# Patient Record
(demographics unavailable — no encounter records)

---

## 2024-10-28 NOTE — HISTORY OF PRESENT ILLNESS
[de-identified] : 40 yo M with hx of HTN presenting for evaluation of back pain x1 year no acute injuries or trauma, denies previous hx of surgeries feels pain at night when going to sleep and when he wakes up in the morning, pain gets better throughout the day  every once in a while will get a sharp pain around his shoulders/neck area without radiation to the hands denies radicular symptoms in the upper or lower extremities denies any weight loss, fevers, night sweats, difficulty breathing, hemoptysis, bowel or bladder incontinence he has been using tylenol or pain management, reports provides some relief

## 2024-10-28 NOTE — PHYSICAL EXAM
Subjective   Gemini Deng is a 46 y.o. female.     She presents today to establish care and for her routine follow-up on current medical problems.  She was previously a patient of Ese Mahmood.  Medical history significant for hypertension, anxiety, vitamin D deficiency, and hypothyroidism.  Surgical history significant for gastric sleeve.  Her BMI is currently 53.8%.  She does have concerns with anxiety and panic symptoms.  She is interested in something she can take every day to help manage the symptoms.  She has previously taken BuSpar, however, this has not been as effective in managing her symptoms.  She has been struggling with chronic fatigue symptoms.  She would like to have her B12 level checked.  Her routine fasting labs are up-to-date.  She is otherwise without any other new complaints today in the office.    Anxiety  Presents for follow-up visit. Symptoms include decreased concentration, excessive worry, nervous/anxious behavior, panic and restlessness. Patient reports no chest pain, compulsions, depressed mood, dizziness, dry mouth, feeling of choking, hyperventilation, impotence, insomnia, irritability, malaise, muscle tension, nausea, obsessions, palpitations, shortness of breath or suicidal ideas. Symptoms occur most days. The severity of symptoms is interfering with daily activities. The quality of sleep is fair.     Compliance with medications is %.   Hypertension  This is a chronic problem. The current episode started more than 1 year ago. The problem has been resolved since onset. The problem is controlled. Associated symptoms include anxiety. Pertinent negatives include no blurred vision, chest pain, headaches, malaise/fatigue, neck pain, orthopnea, palpitations, peripheral edema, PND, shortness of breath or sweats. Risk factors for coronary artery disease include family history, obesity, sedentary lifestyle and stress. Past treatments include angiotensin blockers and diuretics.  Current antihypertension treatment includes angiotensin blockers and diuretics. The current treatment provides significant improvement. There are no compliance problems.  Identifiable causes of hypertension include a thyroid problem.   Thyroid Problem  Presents for follow-up visit. Symptoms include anxiety. Patient reports no cold intolerance, constipation, depressed mood, diaphoresis, diarrhea, fatigue, hair loss, heat intolerance, hoarse voice, leg swelling, menstrual problem, nail problem, palpitations, tremors, visual change, weight gain or weight loss. The symptoms have been stable.        The following portions of the patient's history were reviewed and updated as appropriate: allergies, current medications, past family history, past medical history, past social history, past surgical history and problem list.    Review of Systems   Constitutional: Negative.  Negative for diaphoresis, fatigue, irritability, malaise/fatigue, unexpected weight gain and unexpected weight loss.   HENT: Negative.  Negative for hoarse voice.    Eyes: Negative.  Negative for blurred vision.   Respiratory: Negative.  Negative for shortness of breath.    Cardiovascular: Negative.  Negative for chest pain, palpitations, orthopnea and PND.   Gastrointestinal: Negative.  Negative for constipation, diarrhea and nausea.   Endocrine: Negative for cold intolerance and heat intolerance.   Genitourinary: Negative for impotence and menstrual problem.   Musculoskeletal: Negative.  Negative for neck pain.   Skin: Negative.    Allergic/Immunologic: Negative.    Neurological: Negative.  Negative for dizziness and tremors.   Hematological: Negative.    Psychiatric/Behavioral: Positive for decreased concentration, sleep disturbance and stress. Negative for suicidal ideas and depressed mood. The patient is nervous/anxious. The patient does not have insomnia.        Objective   Physical Exam  Vitals reviewed.   Constitutional:       General: She is not  [de-identified] : Constitutional: Well-nourished, well-developed, No acute distress Respiratory:  Good respiratory effort, no SOB Psychiatric: Pleasant and normal affect, alert and oriented x3 Musculoskeletal: normal except where as noted in regional exam  Cervical Spine Exam APPEARANCE: no marked deformities or malalignment, normal curvature, good posture POSITIVE TENDERNESS: + Bilateral upper trapezius, levator scapula, rhomboid major, and rhomboid minor, + spasm noted in the same muscles. NONTENDER: no bony midline tenderness. ROM: limited in all planes, most notably in flexion and sidebending due to pain RESISTIVE TESTING: painful 4/5 resisted ext, bilateral sidebending, rotation and shoulder shrug , 5/5 flexion  SPECIAL TESTS: neg Spurling's b/l Vasc: 2+ radial pulse b/l Neuro: C5 - T1 intact to motor, DTRs 2+/4 biceps, triceps, brachioradialis Sensation: Intact to light touch throughout b/l UE  Thoracic Spine Exam:  normal curvature and normal alignment. good posture. no midline bony tenderness, + marked spasm and associated tenderness of bilateral paraspinal and periscapular muscles.  ROM mildly limited in sidebending and rotation due to noted spasm  Lumbar Spine Exam  APPEARANCE: no marked deformities or malalignment, normal curvature of the lumbosacral spine. good posture POSITIVE TENDERNESS: + Bilateral lumbar tenderness and spasm noted in erector spinae and quadratus lumborum NONTENDER: no bony midline tenderness ROM: full, although painful at end range of flexion and extension RESISTIVE TESTING: mildly painful 4/5 resisted flex/ext, sidebending b/l, and rotation SPECIAL TESTS: neg SLR b/l, neg RILEY b/l, neg Trendelenburg b/l  PULSES: 2+ DP/PT pulses NEURO:  L1 - S2 intact to sensation and motor, DTRs 2+/4 patella and achilles  in acute distress.     Appearance: She is well-developed. She is morbidly obese. She is not diaphoretic.   HENT:      Head: Normocephalic.      Right Ear: External ear normal.      Left Ear: External ear normal.      Nose: Nose normal.   Eyes:      Pupils: Pupils are equal, round, and reactive to light.   Neck:      Thyroid: No thyromegaly.      Vascular: No JVD.   Cardiovascular:      Rate and Rhythm: Normal rate and regular rhythm.      Heart sounds: No murmur heard.   No friction rub. No gallop.    Pulmonary:      Effort: Pulmonary effort is normal. No respiratory distress.      Breath sounds: Normal breath sounds. No wheezing or rales.   Musculoskeletal:         General: Normal range of motion.      Cervical back: Normal range of motion and neck supple.   Skin:     General: Skin is warm and dry.      Coloration: Skin is not pale.      Findings: No erythema or rash.   Neurological:      Mental Status: She is alert and oriented to person, place, and time.   Psychiatric:         Behavior: Behavior normal.         Thought Content: Thought content normal.         Judgment: Judgment normal.           Assessment/Plan   Diagnoses and all orders for this visit:    1. Chronic fatigue (Primary)  -     Vitamin B12    2. Anxiety  -     escitalopram (Lexapro) 10 MG tablet; Take 1 tablet by mouth Daily.  Dispense: 90 tablet; Refill: 1    3. Panic attack  -     escitalopram (Lexapro) 10 MG tablet; Take 1 tablet by mouth Daily.  Dispense: 90 tablet; Refill: 1    4. Essential hypertension    5. Acquired hypothyroidism  -     levothyroxine (Synthroid) 50 MCG tablet; Take 1 tablet by mouth Every Morning.  Dispense: 90 tablet; Refill: 1    6. Morbid obesity with BMI of 50.0-59.9, adult (Formerly [facility])               Patient's Body mass index is 53.73 kg/m². indicating that she is morbidly obese (BMI > 40 or > 35 with obesity - related health condition). Obesity-related health conditions include the following: hypertension. Obesity is  unchanged. BMI is is above average; BMI management plan is completed. We discussed portion control and increasing exercise..    Lab following office visit.  Start on Lexapro 10 mg once daily for anxiety and panic symptoms.  Continue all other current medications.  Follow up as scheduled for routine follow up.  Follow up sooner for problems/concerns.  Patient verbalized understanding and agreement with plan of care.        This document has been electronically signed by EAN Fuchs on September 28, 2021 10:04 CDT

## 2024-10-28 NOTE — DISCUSSION/SUMMARY
[de-identified] : Discussed findings of today's exam and possible causes of patient's pain.  Educated patient on their most probable diagnosis of chronic cervical, thoracic, and lumbar pain due to paraspinal muscle spasm and likely some mild underlying osteoarthritis.  Reviewed possible courses of treatment, and we collaboratively decided best course of treatment at this time will include conservative management.  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  Patient is advised that prolonged inactivity can lead to osteoarthritis and concomitant degenerative disc disease.  Based on history and clinical exam patient has no radicular features, there is no specific need for x-ray/MRI at this time as patient not a candidate for any injections, he is certainly not a candidate for any type of surgery.  Imaging at this time would not offer him any new treatment options, recommend deferral.  Patient will be started on a course of physical therapy to restore normal range of motion and strength as tolerated.  The goal of physical therapy will be to teach the patient home exercise program.  We discussed adopting a weekly exercise program where he is doing low impact exercise at some point during the week, ideally 3 times per week would be a good starting point.  Patient may take Tylenol or oral NSAIDs as needed for pain relief.  Follow up as needed.  Patient appreciates and agrees with current plan.  This note was generated using dragon medical dictation software.  A reasonable effort has been made for proofreading its contents, but typos may still remain.  If there are any questions or points of clarification needed please notify my office.